# Patient Record
Sex: MALE | Race: OTHER | URBAN - METROPOLITAN AREA
[De-identification: names, ages, dates, MRNs, and addresses within clinical notes are randomized per-mention and may not be internally consistent; named-entity substitution may affect disease eponyms.]

---

## 2018-04-27 ENCOUNTER — EMERGENCY (EMERGENCY)
Facility: HOSPITAL | Age: 23
LOS: 1 days | Discharge: HOME | End: 2018-04-27
Attending: EMERGENCY MEDICINE | Admitting: EMERGENCY MEDICINE
Payer: COMMERCIAL

## 2018-04-27 VITALS
DIASTOLIC BLOOD PRESSURE: 74 MMHG | HEART RATE: 66 BPM | WEIGHT: 175.05 LBS | SYSTOLIC BLOOD PRESSURE: 124 MMHG | RESPIRATION RATE: 18 BRPM | HEIGHT: 68 IN | TEMPERATURE: 98 F | OXYGEN SATURATION: 98 %

## 2018-04-27 DIAGNOSIS — F43.21 ADJUSTMENT DISORDER WITH DEPRESSED MOOD: ICD-10-CM

## 2018-04-27 DIAGNOSIS — R45.851 SUICIDAL IDEATIONS: ICD-10-CM

## 2018-04-27 DIAGNOSIS — R69 ILLNESS, UNSPECIFIED: ICD-10-CM

## 2018-04-27 PROCEDURE — 99283 EMERGENCY DEPT VISIT LOW MDM: CPT

## 2018-04-27 PROCEDURE — 90792 PSYCH DIAG EVAL W/MED SRVCS: CPT

## 2018-04-27 PROCEDURE — 99285 EMERGENCY DEPT VISIT HI MDM: CPT

## 2018-04-27 NOTE — ED PROVIDER NOTE - PHYSICAL EXAMINATION
VITAL SIGNS: I have reviewed nursing notes and confirm.  CONSTITUTIONAL: Well-developed; well-nourished; in no acute distress.  SKIN: Agree with RN documentation regarding decubitus evaluation. Remainder of skin exam is warm and dry, no acute rash.  HEAD: Normocephalic; atraumatic.  EYES: PERRL, EOM intact; conjunctiva and sclera clear.  ENT: No nasal discharge; airway clear.  NECK: Supple; non tender.  CARD: S1, S2 normal; no murmurs, gallops, or rubs. Regular rate and rhythm.  RESP: No wheezes, rales or rhonchi.  ABD: Normal bowel sounds; soft; non-distended; non-tender; no hepatosplenomegaly.  EXT: Normal ROM. No clubbing, cyanosis or edema.  LYMPH: No acute cervical adenopathy.  NEURO: Alert, oriented. Grossly unremarkable.  PSYCH: Cooperative, appropriate.

## 2018-04-27 NOTE — ED BEHAVIORAL HEALTH ASSESSMENT NOTE - DESCRIPTION
As above. Lives with parents. Graduating [employer] Senior N/A Calm, cooperative.

## 2018-04-27 NOTE — ED PROVIDER NOTE - MEDICAL DECISION MAKING DETAILS
no active SI, strong social network, low risk for harming self, cleared by psych. Safe to d/c home with return precautions. has resources and is already involved in therapy.

## 2018-04-27 NOTE — ED PROVIDER NOTE - OBJECTIVE STATEMENT
21 y/o M sent to [facility] ED by [facility] office of student affairs for concerns of suicidal ideation, stating that placed a knife to his throat recently and on a separate occasion sat on the edge of a 6-story car garage thinking about jumping and ending his life as he feels depressed and unable to go on living after his girlfriend broke up with him. Pt has a remote history of marijuana use but denies recent substance abuse. No HI. No AH/VH. Denies suicidal feelings at time of interview, stating that it is when he is alone at home/at night when he begins to think about not living. No medical complaints at this time.

## 2018-04-27 NOTE — ED BEHAVIORAL HEALTH ASSESSMENT NOTE - NAME OF SCHOOL
Senior in [employer] at [employer] (will graduate next month). 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

## 2018-04-27 NOTE — ED BEHAVIORAL HEALTH ASSESSMENT NOTE - HPI (INCLUDE ILLNESS QUALITY, SEVERITY, DURATION, TIMING, CONTEXT, MODIFYING FACTORS, ASSOCIATED SIGNS AND SYMPTOMS)
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  Pt, who denies any prior psychiatric hx, also denies hx of drugs/EtOH/tobacco, acknowledges that he has been feeling intermittently depressed since the break up.  He acknowledges suicidal ideation with intermittent gestures, including taking a knife to his throat on 4/17 while in front of his ex-GF; going to the roof of a 6-story building on 4/23 with thoughts of jumping, as well as looking up ways to kill himself on the internet. Despite all of this, pt denies that he actually had any intention to end his life. In addition, pt reports he both called a close friend and also contacted [facility] hotline, which he reports calmed him down.    Pt started outpt therapy yesterday with Dai Gonzalez ([phone]); states he felt the session was very helpful; next appt on Tuesday, May 1st.    Pt did not go to class this week, but has been keeping up with his schoolwork and exams overall and will be graduating in May. 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  Pt has plans to travel to [place] with his brother-in-law after he graduates. Is also planning to get an M.A. in Education.

## 2018-04-27 NOTE — ED BEHAVIORAL HEALTH ASSESSMENT NOTE - SAFETY PLAN DETAILS
Pt understands he can call 911 or follow up at this or any ED prn worsening ss/sx of depression ganesh SI with active plan

## 2018-04-27 NOTE — ED BEHAVIORAL HEALTH ASSESSMENT NOTE - RISK ASSESSMENT
Pt with intermittent suicidal ideation +/- gestures, though adamantly denies any actual intent or hopes to end his life. Pt lives with his parents, has a number of friends (in fact, has plans to get together with some of them tonight), is future oriented, has started tx, which he reports he enjoys and looks forward to pursuing. Pt at low/moderate risk of self-harm.

## 2018-04-27 NOTE — ED BEHAVIORAL HEALTH ASSESSMENT NOTE - SUMMARY
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  Pt, who denies any prior psychiatric hx, also denies hx of drugs/EtOH/tobacco, acknowledges that he has been feeling intermittently depressed since the break up.
